# Patient Record
Sex: MALE | Race: WHITE | Employment: OTHER | URBAN - NONMETROPOLITAN AREA
[De-identification: names, ages, dates, MRNs, and addresses within clinical notes are randomized per-mention and may not be internally consistent; named-entity substitution may affect disease eponyms.]

---

## 2019-09-10 ENCOUNTER — OFFICE VISIT (OUTPATIENT)
Dept: CARDIOLOGY CLINIC | Age: 44
End: 2019-09-10
Payer: MEDICARE

## 2019-09-10 VITALS
SYSTOLIC BLOOD PRESSURE: 121 MMHG | HEART RATE: 55 BPM | BODY MASS INDEX: 26.01 KG/M2 | HEIGHT: 69 IN | DIASTOLIC BLOOD PRESSURE: 77 MMHG | WEIGHT: 175.6 LBS

## 2019-09-10 DIAGNOSIS — R00.1 SINUS BRADYCARDIA: Primary | ICD-10-CM

## 2019-09-10 DIAGNOSIS — R07.89 CHEST PAIN, ATYPICAL: ICD-10-CM

## 2019-09-10 PROCEDURE — 99204 OFFICE O/P NEW MOD 45 MIN: CPT | Performed by: INTERNAL MEDICINE

## 2019-09-10 PROCEDURE — G8427 DOCREV CUR MEDS BY ELIG CLIN: HCPCS | Performed by: INTERNAL MEDICINE

## 2019-09-10 PROCEDURE — 1036F TOBACCO NON-USER: CPT | Performed by: INTERNAL MEDICINE

## 2019-09-10 PROCEDURE — 93000 ELECTROCARDIOGRAM COMPLETE: CPT | Performed by: INTERNAL MEDICINE

## 2019-09-10 PROCEDURE — G8419 CALC BMI OUT NRM PARAM NOF/U: HCPCS | Performed by: INTERNAL MEDICINE

## 2019-09-10 NOTE — PROGRESS NOTES
Chief Complaint   Patient presents with    New Patient    Establish Cardiologist    Check-Up     NP here for  Abn ekg with bradycardia      EKG done today    Chest Pain   Patient complains of chest pain. For the last month . Retrosternal, sudden in onset, Symptoms have been unchanged since that time. The patient's pain is intermittent. The patient describes the pain as sharp and does not radiate. Patient rates pain as a 6/10 in intensity. Associated symptoms are: none. Aggravating factors are: none. Alleviating factors are: none. CP last 1 min    freq 3 x/ week    Denied sob or leg edema    Dizziness occasional on getting up    No palpitation    No syncope    Non-smoker    FHX  Mother had CHF  None cad    Work- land scaper  Patient Active Problem List   Diagnosis    Sinus bradycardia    Chest pain, atypical       History reviewed. No pertinent surgical history. No Known Allergies     History reviewed. No pertinent family history.      Social History     Socioeconomic History    Marital status: Single     Spouse name: Not on file    Number of children: Not on file    Years of education: Not on file    Highest education level: Not on file   Occupational History    Not on file   Social Needs    Financial resource strain: Not on file    Food insecurity:     Worry: Not on file     Inability: Not on file    Transportation needs:     Medical: Not on file     Non-medical: Not on file   Tobacco Use    Smoking status: Never Smoker    Smokeless tobacco: Never Used   Substance and Sexual Activity    Alcohol use: Not on file    Drug use: Not on file    Sexual activity: Not on file   Lifestyle    Physical activity:     Days per week: Not on file     Minutes per session: Not on file    Stress: Not on file   Relationships    Social connections:     Talks on phone: Not on file     Gets together: Not on file     Attends [employer] service: Not on file     Active member of club or organization: Not on file

## 2019-09-25 ENCOUNTER — HOSPITAL ENCOUNTER (OUTPATIENT)
Dept: NON INVASIVE DIAGNOSTICS | Age: 44
Discharge: HOME | End: 2019-09-25
Payer: COMMERCIAL

## 2019-09-25 VITALS — BODY MASS INDEX: 26.22 KG/M2 | HEIGHT: 69 IN | WEIGHT: 177 LBS

## 2019-09-25 DIAGNOSIS — R00.1 SINUS BRADYCARDIA: ICD-10-CM

## 2019-09-25 DIAGNOSIS — R07.89 CHEST PAIN, ATYPICAL: ICD-10-CM

## 2019-09-25 LAB
LV EF: 50 %
LV EF: 60 %
LVEF MODALITY: NORMAL
LVEF MODALITY: NORMAL

## 2019-09-25 PROCEDURE — 93225 XTRNL ECG REC<48 HRS REC: CPT

## 2019-09-25 PROCEDURE — 2709999900 HC NON-CHARGEABLE SUPPLY

## 2019-09-25 PROCEDURE — 93017 CV STRESS TEST TRACING ONLY: CPT | Performed by: INTERNAL MEDICINE

## 2019-09-25 PROCEDURE — 3430000000 HC RX DIAGNOSTIC RADIOPHARMACEUTICAL: Performed by: INTERNAL MEDICINE

## 2019-09-25 PROCEDURE — A9500 TC99M SESTAMIBI: HCPCS | Performed by: INTERNAL MEDICINE

## 2019-09-25 PROCEDURE — 93306 TTE W/DOPPLER COMPLETE: CPT

## 2019-09-25 PROCEDURE — 93226 XTRNL ECG REC<48 HR SCAN A/R: CPT

## 2019-09-25 PROCEDURE — 78452 HT MUSCLE IMAGE SPECT MULT: CPT

## 2019-09-25 RX ADMIN — Medication 8.9 MILLICURIE: at 11:35

## 2019-09-25 RX ADMIN — Medication 30 MILLICURIE: at 13:05

## 2019-10-03 LAB
ACQUISITION DURATION: NORMAL S
AVERAGE HEART RATE: 71 BPM
FASTEST SUPRAVENTRICULAR RATE: 78 BPM
HOOKUP DATE: NORMAL
HOOKUP TIME: NORMAL
LONGEST SUPRAVENTRICULAR RATE: 78 BPM
MAX HEART RATE TIME/DATE: NORMAL
MAX HEART RATE: 115 BPM
MIN HEART RATE TIME/DATE: NORMAL
MIN HEART RATE: 42 BPM
NUMBER OF FASTEST SUPRAVENTRICULAR BEATS: 5
NUMBER OF LONGEST SUPRAVENTRICULAR BEATS: 5
NUMBER OF QRS COMPLEXES: NORMAL
NUMBER OF SUPRAVENTRICULAR BEATS IN RUNS: 12
NUMBER OF SUPRAVENTRICULAR COUPLETS: 4
NUMBER OF SUPRAVENTRICULAR ECTOPICS: 23
NUMBER OF SUPRAVENTRICULAR ISOLATED BEATS: 3
NUMBER OF SUPRAVENTRICULAR RUNS: 3
NUMBER OF VENTRICULAR BEATS IN RUNS: 0
NUMBER OF VENTRICULAR BIGEMINAL CYCLES: 0
NUMBER OF VENTRICULAR COUPLETS: 0
NUMBER OF VENTRICULAR ECTOPICS: 43
NUMBER OF VENTRICULAR ISOLATED BEATS: 43
NUMBER OF VENTRICULAR RUNS: 0